# Patient Record
Sex: MALE | Race: BLACK OR AFRICAN AMERICAN
[De-identification: names, ages, dates, MRNs, and addresses within clinical notes are randomized per-mention and may not be internally consistent; named-entity substitution may affect disease eponyms.]

---

## 2019-09-10 ENCOUNTER — HOSPITAL ENCOUNTER (OUTPATIENT)
Dept: HOSPITAL 62 - OD | Age: 54
End: 2019-09-10
Attending: ORTHOPAEDIC SURGERY
Payer: OTHER GOVERNMENT

## 2019-09-10 DIAGNOSIS — Z01.810: Primary | ICD-10-CM

## 2019-09-10 DIAGNOSIS — Z01.812: ICD-10-CM

## 2019-09-10 DIAGNOSIS — Z01.811: ICD-10-CM

## 2019-09-10 DIAGNOSIS — M17.12: ICD-10-CM

## 2019-09-10 DIAGNOSIS — I10: ICD-10-CM

## 2019-09-10 LAB
ADD MANUAL DIFF: NO
ANION GAP SERPL CALC-SCNC: 7 MMOL/L (ref 5–19)
APPEARANCE UR: CLEAR
APTT PPP: (no result) S
BASOPHILS # BLD AUTO: 0 10^3/UL (ref 0–0.2)
BASOPHILS NFR BLD AUTO: 1.1 % (ref 0–2)
BILIRUB UR QL STRIP: NEGATIVE
BUN SERPL-MCNC: 11 MG/DL (ref 7–20)
CALCIUM: 9.6 MG/DL (ref 8.4–10.2)
CHLORIDE SERPL-SCNC: 102 MMOL/L (ref 98–107)
CO2 SERPL-SCNC: 30 MMOL/L (ref 22–30)
EOSINOPHIL # BLD AUTO: 0.1 10^3/UL (ref 0–0.6)
EOSINOPHIL NFR BLD AUTO: 3.6 % (ref 0–6)
ERYTHROCYTE [DISTWIDTH] IN BLOOD BY AUTOMATED COUNT: 13.9 % (ref 11.5–14)
GLUCOSE SERPL-MCNC: 69 MG/DL (ref 75–110)
GLUCOSE UR STRIP-MCNC: NEGATIVE MG/DL
HCT VFR BLD CALC: 41.5 % (ref 37.9–51)
HGB BLD-MCNC: 13.7 G/DL (ref 13.5–17)
KETONES UR STRIP-MCNC: NEGATIVE MG/DL
LYMPHOCYTES # BLD AUTO: 2 10^3/UL (ref 0.5–4.7)
LYMPHOCYTES NFR BLD AUTO: 50.4 % (ref 13–45)
MCH RBC QN AUTO: 29.6 PG (ref 27–33.4)
MCHC RBC AUTO-ENTMCNC: 32.9 G/DL (ref 32–36)
MCV RBC AUTO: 90 FL (ref 80–97)
MONOCYTES # BLD AUTO: 0.3 10^3/UL (ref 0.1–1.4)
MONOCYTES NFR BLD AUTO: 7.1 % (ref 3–13)
NEUTROPHILS # BLD AUTO: 1.5 10^3/UL (ref 1.7–8.2)
NEUTS SEG NFR BLD AUTO: 37.8 % (ref 42–78)
NITRITE UR QL STRIP: NEGATIVE
PH UR STRIP: 6 [PH] (ref 5–9)
PLATELET # BLD: 199 10^3/UL (ref 150–450)
POTASSIUM SERPL-SCNC: 4 MMOL/L (ref 3.6–5)
PROT UR STRIP-MCNC: NEGATIVE MG/DL
RBC # BLD AUTO: 4.62 10^6/UL (ref 4.35–5.55)
SP GR UR STRIP: 1
TOTAL CELLS COUNTED % (AUTO): 100 %
UROBILINOGEN UR-MCNC: NEGATIVE MG/DL (ref ?–2)
WBC # BLD AUTO: 4.1 10^3/UL (ref 4–10.5)

## 2019-09-10 PROCEDURE — 36415 COLL VENOUS BLD VENIPUNCTURE: CPT

## 2019-09-10 PROCEDURE — 71046 X-RAY EXAM CHEST 2 VIEWS: CPT

## 2019-09-10 PROCEDURE — 80048 BASIC METABOLIC PNL TOTAL CA: CPT

## 2019-09-10 PROCEDURE — 81001 URINALYSIS AUTO W/SCOPE: CPT

## 2019-09-10 PROCEDURE — 93005 ELECTROCARDIOGRAM TRACING: CPT

## 2019-09-10 PROCEDURE — 85025 COMPLETE CBC W/AUTO DIFF WBC: CPT

## 2019-09-10 PROCEDURE — 93010 ELECTROCARDIOGRAM REPORT: CPT

## 2019-09-10 NOTE — RADIOLOGY REPORT (SQ)
EXAM DESCRIPTION:  CHEST PA/LATERAL



COMPLETED DATE/TIME:  9/10/2019 10:18 am



REASON FOR STUDY:  PRE-OP



COMPARISON:  None.



EXAM PARAMETERS:  NUMBER OF VIEWS: two views

TECHNIQUE: Digital Frontal and Lateral radiographic views of the chest acquired.

RADIATION DOSE: NA

LIMITATIONS: none



FINDINGS:  LUNGS AND PLEURA: No opacities, masses or pneumothorax. No pleural effusion.

MEDIASTINUM AND HILAR STRUCTURES: No masses or contour abnormalities.

HEART AND VASCULAR STRUCTURES: Heart normal size.  No evidence for failure.

BONES: No acute findings.

HARDWARE: None in the chest.

OTHER: No other significant finding.



IMPRESSION:  NO SIGNIFICANT RADIOGRAPHIC FINDING IN THE CHEST.



TECHNICAL DOCUMENTATION:  JOB ID:  [id number]

 2011 [employer]- All Rights Reserved



Reading location - IP/workstation name: [email or web address]

## 2019-09-10 NOTE — EKG REPORT
SEVERITY:- BORDERLINE ECG -

SINUS RHYTHM

BORDERLINE T ABNORMALITIES, DIFFUSE LEADS

:

Confirmed by: Debby Vega MD 10-Sep-2019 21:01:42

## 2019-10-09 ENCOUNTER — HOSPITAL ENCOUNTER (INPATIENT)
Dept: HOSPITAL 62 - INOR | Age: 54
LOS: 1 days | Discharge: HOME HEALTH | DRG: 470 | End: 2019-10-10
Attending: ORTHOPAEDIC SURGERY | Admitting: ORTHOPAEDIC SURGERY
Payer: OTHER GOVERNMENT

## 2019-10-09 DIAGNOSIS — F41.9: ICD-10-CM

## 2019-10-09 DIAGNOSIS — I10: ICD-10-CM

## 2019-10-09 DIAGNOSIS — M17.12: Primary | ICD-10-CM

## 2019-10-09 DIAGNOSIS — Z79.899: ICD-10-CM

## 2019-10-09 DIAGNOSIS — F32.9: ICD-10-CM

## 2019-10-09 DIAGNOSIS — E78.00: ICD-10-CM

## 2019-10-09 DIAGNOSIS — M10.9: ICD-10-CM

## 2019-10-09 DIAGNOSIS — G47.30: ICD-10-CM

## 2019-10-09 PROCEDURE — 36415 COLL VENOUS BLD VENIPUNCTURE: CPT

## 2019-10-09 PROCEDURE — 0SRD0JA REPLACEMENT OF LEFT KNEE JOINT WITH SYNTHETIC SUBSTITUTE, UNCEMENTED, OPEN APPROACH: ICD-10-PCS | Performed by: ORTHOPAEDIC SURGERY

## 2019-10-09 PROCEDURE — 88311 DECALCIFY TISSUE: CPT

## 2019-10-09 PROCEDURE — 88305 TISSUE EXAM BY PATHOLOGIST: CPT

## 2019-10-09 PROCEDURE — 85027 COMPLETE CBC AUTOMATED: CPT

## 2019-10-09 PROCEDURE — 90686 IIV4 VACC NO PRSV 0.5 ML IM: CPT

## 2019-10-09 PROCEDURE — C1776 JOINT DEVICE (IMPLANTABLE): HCPCS

## 2019-10-09 PROCEDURE — 94799 UNLISTED PULMONARY SVC/PX: CPT

## 2019-10-09 PROCEDURE — 80048 BASIC METABOLIC PNL TOTAL CA: CPT

## 2019-10-09 PROCEDURE — C1713 ANCHOR/SCREW BN/BN,TIS/BN: HCPCS

## 2019-10-09 RX ADMIN — SENNOSIDES, DOCUSATE SODIUM SCH EACH: 50; 8.6 TABLET, FILM COATED ORAL at 18:26

## 2019-10-09 RX ADMIN — HALOPERIDOL SCH: 2 TABLET ORAL at 14:52

## 2019-10-09 RX ADMIN — ALLOPURINOL SCH: 300 TABLET ORAL at 14:54

## 2019-10-09 RX ADMIN — Medication SCH: at 13:32

## 2019-10-09 RX ADMIN — OXYCODONE HYDROCHLORIDE SCH MG: 10 TABLET, FILM COATED, EXTENDED RELEASE ORAL at 21:03

## 2019-10-09 RX ADMIN — IBUPROFEN SCH MLS/HR: 800 INJECTION INTRAVENOUS at 23:06

## 2019-10-09 RX ADMIN — BENZONATATE SCH: 100 CAPSULE ORAL at 14:54

## 2019-10-09 RX ADMIN — ASPIRIN SCH: 81 TABLET, COATED ORAL at 14:52

## 2019-10-09 RX ADMIN — OXYCODONE HYDROCHLORIDE PRN MG: 5 TABLET ORAL at 15:03

## 2019-10-09 RX ADMIN — ATORVASTATIN CALCIUM SCH: 40 TABLET, FILM COATED ORAL at 14:53

## 2019-10-09 RX ADMIN — TRAZODONE HYDROCHLORIDE SCH MG: 50 TABLET ORAL at 21:03

## 2019-10-09 RX ADMIN — Medication SCH: at 14:53

## 2019-10-09 RX ADMIN — IBUPROFEN SCH MLS/HR: 800 INJECTION INTRAVENOUS at 15:04

## 2019-10-09 RX ADMIN — BUPROPION HYDROCHLORIDE SCH MG: 100 TABLET, FILM COATED ORAL at 18:25

## 2019-10-09 RX ADMIN — Medication SCH ML: at 21:06

## 2019-10-09 RX ADMIN — SENNOSIDES, DOCUSATE SODIUM SCH: 50; 8.6 TABLET, FILM COATED ORAL at 14:53

## 2019-10-09 RX ADMIN — HALOPERIDOL SCH MG: 2 TABLET ORAL at 18:26

## 2019-10-09 RX ADMIN — BACLOFEN SCH MG: 10 TABLET ORAL at 18:25

## 2019-10-09 RX ADMIN — OXYCODONE HYDROCHLORIDE SCH: 10 TABLET, FILM COATED, EXTENDED RELEASE ORAL at 14:53

## 2019-10-09 RX ADMIN — BACLOFEN SCH: 10 TABLET ORAL at 14:52

## 2019-10-09 RX ADMIN — OXYCODONE HYDROCHLORIDE PRN MG: 5 TABLET ORAL at 23:06

## 2019-10-09 RX ADMIN — BACLOFEN SCH MG: 10 TABLET ORAL at 15:04

## 2019-10-09 RX ADMIN — BUPROPION HYDROCHLORIDE SCH: 100 TABLET, FILM COATED ORAL at 14:54

## 2019-10-09 NOTE — OPERATIVE REPORT
Operative Report


DATE OF SURGERY: 10/09/19


PREOPERATIVE DIAGNOSIS: Left knee arthritis


OPERATION: Left knee arthroplasty


SURGEON: LISA DEWITT


ANESTHESIA: Spinal


TISSUE REMOVED OR ALTERED: Bone to pathology


ESTIMATED BLOOD LOSS: 75


PROCEDURE: 





Implants used:


Femur: Sis triathlon size 7 CR uncemented femur


Tibia: 7 uncemented tibia


Tibial liner: 9 mm CS insert


Patella: 38 mm oval patella





Procedure with the patient supine on the operating table the left the limb is 

prepped and draped in a sterile fashion.  The limb was elevated for 

exsanguination and the tourniquet inflated to 280 torr.  A standard midline 

median parapatellar approach the knee is taken.  Access is gained to the femoral

canal through the intercondylar notch.  Intramedullary alignment instrumentation

used to resect 10 mm of distal femur in 5 of valgus.  Sizing guide indicated a 

size 7 femur.  Appropriate cutting jig is then used to fashion anterior 

posterior and chamfer cuts.  A trial reduction femurs performed and this is 

judged to be adequate.





Attention was next turned to the tibia.  Using an extra medullary alignment 

system 9 millimeters was resected off the lateral tibial plateau.  This is sized

to a size been tibia.  A trial reduction was now performed with a 7 femur and a 

7 tibia using a 9 millimeters spacer.  It is full extension and central 

patellofemoral tracking.  The articular surface the patella was next resected 

using an oscillating saw.





All trial implants were removed.  The above implants are impacted into place.  

The tourniquet was deflated hemostasis obtained the wound is then closed in 

layers using interrupted Vicryl followed by staples.  A sterile compressive 

dressing was applied and the patient returned to recovery room in satisfactory 

condition.

## 2019-10-09 NOTE — RADIOLOGY REPORT (SQ)
EXAM DESCRIPTION:  KNEE LEFT 2 VIEWS



COMPLETED DATE/TIME:  10/9/2019 9:08 am



REASON FOR STUDY:  Post OP -Long Cassette in PACU M17.12  UNILATERAL PRIMARY OSTEOARTHRITIS, LEFT KNE
E



COMPARISON:  None.



NUMBER OF VIEWS:  Two view(s).



TECHNIQUE:  Digital radiographic images of the left knee post-procedure.



LIMITATIONS:  None.



FINDINGS:  BONES: No  worrisome or unexpected findings post-procedure.

DEVICE: Total knee arthroplasty.

SOFT TISSUES:  No worrisome findings.  Expected postoperative soft tissue changes.



IMPRESSION:  SATISFACTORY POSTOPERATIVE LEFT KNEE.



TECHNICAL DOCUMENTATION:  JOB ID:  [id number]

 2011 [employer]- All Rights Reserved



Reading location - IP/workstation name: [email or web address]

## 2019-10-10 VITALS — SYSTOLIC BLOOD PRESSURE: 134 MMHG | DIASTOLIC BLOOD PRESSURE: 91 MMHG

## 2019-10-10 LAB
ANION GAP SERPL CALC-SCNC: 6 MMOL/L (ref 5–19)
BUN SERPL-MCNC: 8 MG/DL (ref 7–20)
CALCIUM: 9 MG/DL (ref 8.4–10.2)
CHLORIDE SERPL-SCNC: 99 MMOL/L (ref 98–107)
CO2 SERPL-SCNC: 31 MMOL/L (ref 22–30)
ERYTHROCYTE [DISTWIDTH] IN BLOOD BY AUTOMATED COUNT: 13.6 % (ref 11.5–14)
GLUCOSE SERPL-MCNC: 129 MG/DL (ref 75–110)
HCT VFR BLD CALC: 35.2 % (ref 37.9–51)
HGB BLD-MCNC: 12 G/DL (ref 13.5–17)
MCH RBC QN AUTO: 30.6 PG (ref 27–33.4)
MCHC RBC AUTO-ENTMCNC: 34.2 G/DL (ref 32–36)
MCV RBC AUTO: 90 FL (ref 80–97)
PLATELET # BLD: 179 10^3/UL (ref 150–450)
POTASSIUM SERPL-SCNC: 4.5 MMOL/L (ref 3.6–5)
RBC # BLD AUTO: 3.93 10^6/UL (ref 4.35–5.55)
WBC # BLD AUTO: 7.3 10^3/UL (ref 4–10.5)

## 2019-10-10 RX ADMIN — IBUPROFEN SCH: 800 INJECTION INTRAVENOUS at 13:36

## 2019-10-10 RX ADMIN — Medication SCH CAP: at 09:53

## 2019-10-10 RX ADMIN — Medication SCH: at 15:56

## 2019-10-10 RX ADMIN — ATORVASTATIN CALCIUM SCH MG: 40 TABLET, FILM COATED ORAL at 09:53

## 2019-10-10 RX ADMIN — BACLOFEN SCH MG: 10 TABLET ORAL at 10:02

## 2019-10-10 RX ADMIN — IBUPROFEN SCH MLS/HR: 800 INJECTION INTRAVENOUS at 05:18

## 2019-10-10 RX ADMIN — BENZONATATE SCH MG: 100 CAPSULE ORAL at 09:54

## 2019-10-10 RX ADMIN — BACLOFEN SCH MG: 10 TABLET ORAL at 13:33

## 2019-10-10 RX ADMIN — Medication SCH ML: at 05:19

## 2019-10-10 RX ADMIN — OXYCODONE HYDROCHLORIDE SCH MG: 10 TABLET, FILM COATED, EXTENDED RELEASE ORAL at 09:53

## 2019-10-10 RX ADMIN — OXYCODONE HYDROCHLORIDE PRN MG: 5 TABLET ORAL at 07:42

## 2019-10-10 RX ADMIN — HALOPERIDOL SCH MG: 2 TABLET ORAL at 09:56

## 2019-10-10 RX ADMIN — ALLOPURINOL SCH MG: 300 TABLET ORAL at 10:02

## 2019-10-10 RX ADMIN — TRAZODONE HYDROCHLORIDE SCH MG: 50 TABLET ORAL at 09:55

## 2019-10-10 RX ADMIN — BUPROPION HYDROCHLORIDE SCH MG: 100 TABLET, FILM COATED ORAL at 10:10

## 2019-10-10 RX ADMIN — ASPIRIN SCH MG: 81 TABLET, COATED ORAL at 09:53

## 2019-10-10 RX ADMIN — SENNOSIDES, DOCUSATE SODIUM SCH EACH: 50; 8.6 TABLET, FILM COATED ORAL at 09:54

## 2019-10-10 NOTE — PDOC DISCHARGE SUMMARY
Impression





- Admit/DC Date/PCP


Admission Date/Primary Care Provider: 


  10/09/19 05:27





  [facility]





Discharge Date: 10/10/19





- Additional Information


Resuscitation Status: Full Code


Discharge Diet: Regular


Discharge Activity: Balance Activity w/Rest, No tub bath


Referrals: 


LISA DEWITT MD [ACTIVE STAFF] - 10/22/19 9:45 am


Home Medications: 








Allopurinol [Zyloprim 300 mg Tablet] 300 mg PO DAILY 09/23/19 


Atenolol [Tenormin] 25 mg PO DAILY 09/23/19 


Atorvastatin Calcium [Lipitor 40 mg Tablet] 20 mg PO DAILY 09/23/19 


Baclofen [Baclofen 10 mg Tablet] 10 mg PO TID 09/23/19 


Benzonatate [Tessalon Perles 100 mg Capsule] 100 mg PO DAILY 09/23/19 


Cholecalciferol (Vitamin D3) [Vitamin D3] 2,000 unit PO DAILY 09/23/19 


Haloperidol [Haldol 2 mg Tablet] 2 mg PO BID 09/23/19 


Prazosin HCl 2 mg PO QHS 09/23/19 


Bupropion HCl [Wellbutrin Sr 100 mg Tablet] 1 tab PO Q12 10/09/19 


Trazodone HCl [Desyrel 50 mg Tablet] 25 mg PO HSP PRN 10/09/19 











History of Present Illiness


History of Present Illness: 


MARY BETH RAE is a 54 year old male


Patient is a 54-year-old black male with progressive left knee pain and 

functional disability second osteoarthritis.  Patient admitted for elective left

knee arthroplasty.





Hospital Course


Hospital Course: 


Patient is admitted through the operating where he undergoes an uncomplicated 

left knee arthroplasty.  Is returned to floor in satisfactory condition.  He 

makes excellent progress with physical therapy and weightbearing as tolerated 

basis.  His compressive dressing was removed on postop day 1.  Underlying OpSite

dressings clean dry and intact.





Physical Exam


Vital Signs: 


                                        











Temp Pulse Resp BP Pulse Ox


 


 36.4 C   105 H  18   118/96 H  100 


 


 10/09/19 23:09  10/09/19 23:09  10/09/19 19:37  10/09/19 23:09  10/10/19 03:56








                                 Intake & Output











 10/08/19 10/09/19 10/10/19





 06:59 06:59 06:59


 


Intake Total  0 6944


 


Output Total   3081


 


Balance  0 3863


 


Weight   89.5 kg











General appearance: PRESENT: no acute distress


Head exam: PRESENT: normocephalic


Respiratory exam: PRESENT: unlabored


Cardiovascular exam: PRESENT: RRR


Pulses: PRESENT: +1 pedal pulses bilateral


Vascular exam: PRESENT: normal capillary refill


GI/Abdominal exam: PRESENT: soft


Rectal exam: PRESENT: deferred


Musculoskeletal exam: PRESENT: other - Compressive drip dressing was removed on 

postop day 1.  Underlying OpSite dressings clean dry and intact.  There is 

minimal pedal edema.  Distal neurovascular examination is intact.


Neurological exam: PRESENT: alert, awake, oriented to person, oriented to place,

oriented to time, oriented to situation.  ABSENT: motor sensory deficit


Psychiatric exam: PRESENT: appropriate affect, normal mood.  ABSENT: homicidal 

ideation, suicidal ideation


Skin exam: PRESENT: dry, intact, warm.  ABSENT: cyanosis, rash





Results


Laboratory Results: 


                                        











WBC  7.3 10^3/uL (4.0-10.5)   10/10/19  05:21    


 


RBC  3.93 10^6/uL (4.35-5.55)  L  10/10/19  05:21    


 


Hgb  12.0 g/dL (13.5-17.0)  L  10/10/19  05:21    


 


Hct  35.2 % (37.9-51.0)  L  10/10/19  05:21    


 


MCV  90 fl (80-97)   10/10/19  05:21    


 


MCH  30.6 pg (27.0-33.4)   10/10/19  05:21    


 


MCHC  34.2 g/dL (32.0-36.0)   10/10/19  05:21    


 


RDW  13.6 % (11.5-14.0)   10/10/19  05:21    


 


Plt Count  179 10^3/uL (150-450)   10/10/19  05:21    


 


Sodium  135.5 mmol/L (137-145)  L  10/10/19  05:21    


 


Potassium  4.5 mmol/L (3.6-5.0)   10/10/19  05:21    


 


Chloride  99 mmol/L ()   10/10/19  05:21    


 


Carbon Dioxide  31 mmol/L (22-30)  H  10/10/19  05:21    


 


Anion Gap  6  (5-19)   10/10/19  05:21    


 


BUN  8 mg/dL (7-20)   10/10/19  05:21    


 


Creatinine  0.99 mg/dL (0.52-1.25)   10/10/19  05:21    


 


Est GFR ( Amer)  > 60  (>60)   10/10/19  05:21    


 


Est GFR (MDRD) Non-Af  > 60  (>60)   10/10/19  05:21    


 


Glucose  129 mg/dL ()  H  10/10/19  05:21    


 


Calcium  9.0 mg/dL (8.4-10.2)   10/10/19  05:21    











Impressions: 


                                        





Knee X-Ray  10/09/19 08:36


IMPRESSION:  SATISFACTORY POSTOPERATIVE LEFT KNEE.


 














Plan


Plan of Treatment: 


Patient be discharged home with home health services and DME.  Patient is 

weightbearing as tolerated on the left lower extremity.  Follow-up with Dr. Dewitt [facility] for surgery in 2 weeks for staple removal.





Stroke


Is this a Stroke Patient?: No


Stroke Pt being discharged on Anti-thrombolytic therapy?: Yes





Acute Heart Failure





- **


Is this a Heart Failure Patient?: No